# Patient Record
Sex: MALE | Race: WHITE | NOT HISPANIC OR LATINO
[De-identification: names, ages, dates, MRNs, and addresses within clinical notes are randomized per-mention and may not be internally consistent; named-entity substitution may affect disease eponyms.]

---

## 2020-01-31 PROBLEM — Z00.00 ENCOUNTER FOR PREVENTIVE HEALTH EXAMINATION: Status: ACTIVE | Noted: 2020-01-31

## 2020-02-04 ENCOUNTER — APPOINTMENT (OUTPATIENT)
Dept: ORTHOPEDIC SURGERY | Facility: CLINIC | Age: 65
End: 2020-02-04
Payer: COMMERCIAL

## 2020-02-04 VITALS — RESPIRATION RATE: 16 BRPM | WEIGHT: 185 LBS | BODY MASS INDEX: 26.48 KG/M2 | HEIGHT: 70 IN

## 2020-02-04 DIAGNOSIS — M65.331 TRIGGER FINGER, RIGHT MIDDLE FINGER: ICD-10-CM

## 2020-02-04 DIAGNOSIS — M19.042 PRIMARY OSTEOARTHRITIS, LEFT HAND: ICD-10-CM

## 2020-02-04 DIAGNOSIS — M65.341 TRIGGER FINGER, RIGHT RING FINGER: ICD-10-CM

## 2020-02-04 DIAGNOSIS — M19.041 PRIMARY OSTEOARTHRITIS, RIGHT HAND: ICD-10-CM

## 2020-02-04 PROCEDURE — 99203 OFFICE O/P NEW LOW 30 MIN: CPT | Mod: 25

## 2020-02-04 PROCEDURE — 73130 X-RAY EXAM OF HAND: CPT | Mod: RT

## 2020-02-04 PROCEDURE — 20550 NJX 1 TENDON SHEATH/LIGAMENT: CPT | Mod: RT

## 2020-03-19 ENCOUNTER — TRANSCRIPTION ENCOUNTER (OUTPATIENT)
Age: 65
End: 2020-03-19

## 2020-03-20 ENCOUNTER — OUTPATIENT (OUTPATIENT)
Dept: OUTPATIENT SERVICES | Facility: HOSPITAL | Age: 65
LOS: 1 days | Discharge: HOME | End: 2020-03-20
Payer: COMMERCIAL

## 2020-03-20 PROCEDURE — 88305 TISSUE EXAM BY PATHOLOGIST: CPT | Mod: 26

## 2020-03-24 LAB — SURGICAL PATHOLOGY STUDY: SIGNIFICANT CHANGE UP

## 2020-03-30 ENCOUNTER — APPOINTMENT (OUTPATIENT)
Dept: OTOLARYNGOLOGY | Facility: CLINIC | Age: 65
End: 2020-03-30
Payer: COMMERCIAL

## 2020-03-30 VITALS
OXYGEN SATURATION: 97 % | WEIGHT: 185 LBS | SYSTOLIC BLOOD PRESSURE: 116 MMHG | DIASTOLIC BLOOD PRESSURE: 69 MMHG | BODY MASS INDEX: 26.48 KG/M2 | HEART RATE: 67 BPM | HEIGHT: 70 IN

## 2020-03-30 DIAGNOSIS — Z85.9 PERSONAL HISTORY OF MALIGNANT NEOPLASM, UNSPECIFIED: ICD-10-CM

## 2020-03-30 DIAGNOSIS — Z87.09 PERSONAL HISTORY OF OTHER DISEASES OF THE RESPIRATORY SYSTEM: ICD-10-CM

## 2020-03-30 DIAGNOSIS — Z80.9 FAMILY HISTORY OF MALIGNANT NEOPLASM, UNSPECIFIED: ICD-10-CM

## 2020-03-30 DIAGNOSIS — Z82.3 FAMILY HISTORY OF STROKE: ICD-10-CM

## 2020-03-30 PROCEDURE — 99203 OFFICE O/P NEW LOW 30 MIN: CPT

## 2020-03-30 NOTE — REVIEW OF SYSTEMS
[Patient Intake Form Reviewed] : Patient intake form was reviewed [As noted in HPI] : as noted in HPI [Negative] : Heme/Lymph [Seasonal Allergies] : seasonal allergies [As Noted in HPI] : as noted in HPI [Hoarseness] : hoarseness

## 2020-04-02 NOTE — ASSESSMENT
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 Patient was not scoped today due to COVID 19 regulations. Management options discussed including rebiopsy, surgical resection or radiation if invasive SCC on biopsy. \par - Patient to be scoped once the situation permits and COVID 19 regulations resolves\par - Possible Direct laryngoscopy +/-  suspension microlaryngoscopy +/- LTVC  Biopsy +/- LTVC lesion surgical resection/CO2 laser \par

## 2020-04-02 NOTE — HISTORY OF PRESENT ILLNESS
[de-identified] : 64M nonsmoker and otherwise healthy developed hoarseness 3 months ago. He denies any other asociated symptoms like pain, dysphagia, aspiration/pneumonia, weight loss, neck masses or otalgia. He was evaluated by Dr. Vidales evaluated the patient and scope revealed left TVC lesion. He had CT-Neck and was reported as no cervical lymph adenopathy. Patient had biopsy on (03/20/20) of the eft vocal cord that showed  SCC in-situ. \par He is presenting today for evaluation if any further treatment is needed. \par He is a  and worked at  [employer].

## 2020-04-20 ENCOUNTER — APPOINTMENT (OUTPATIENT)
Dept: OTOLARYNGOLOGY | Facility: CLINIC | Age: 65
End: 2020-04-20
Payer: COMMERCIAL

## 2020-04-20 PROCEDURE — 99214 OFFICE O/P EST MOD 30 MIN: CPT | Mod: 25

## 2020-04-20 PROCEDURE — 31575 DIAGNOSTIC LARYNGOSCOPY: CPT

## 2020-04-22 NOTE — ASSESSMENT
[FreeTextEntry1] : 65M nonsmoker and otherwise healthy had left TVC SCC in situ that was biopsied by Dr. Vidales on 03/22/20. He had CT-Neck and was reported as no cervical lymph adenopathy. Patient is presenting today as his hoarseness is gettign worse but no other complaints. On flexible nasolaryngoscopy: hypotrophic left TVC with no obvious lesions and edematous right TVC. \par - F/u in 1 months for rescope and r/a for suspension  microlaryngosocpy

## 2020-04-22 NOTE — HISTORY OF PRESENT ILLNESS
[de-identified] : 64M nonsmoker and otherwise healthy developed hoarseness 3 months ago. He denies any other associated symptoms like pain, dysphagia, aspiration/pneumonia, weight loss, neck masses or otalgia. He was evaluated by Dr. Vidales evaluated the patient and scope revealed left TVC lesion. He had CT-Neck and was reported as no cervical lymph adenopathy. Patient had biopsy on (03/20/20) of the eft vocal cord that showed SCC in-situ. \par \par He is a  and worked at [employer]. \par   [FreeTextEntry1] : Patient is presenting today for progressive hoarseness and scope. He denies any other associated symptoms.

## 2020-04-22 NOTE — PROCEDURE
[Image(s) Captured] : image(s) captured and filed [Unable to Cooperate with Mirror] : patient unable to cooperate with mirror [None] : none [Flexible Endoscope] : examined with the flexible endoscope [Normal] : normal [True Vocal Cords Paralysis] : no true vocal cord paralysis [Glottis Edema] : ~M edematous on the right [True Vocal Cords Donnelly's Nodules] : no true vocal cord nodules [True Vocal Cords Erythematous] : true vocal cord edema on the right [Glottis Arytenoid Cartilages Erythema] : no arytenoid erythema [Glottis Arytenoid Cartilages] : no arytenoid granulomas [de-identified] : Images attached:\par Left TVC hypotrophy with no obvious lesions\par Edematous RTVC with no nodules or masses seen

## 2020-04-22 NOTE — PHYSICAL EXAM
[Midline] : trachea located in midline position [Laryngoscopy Performed] : laryngoscopy was performed, see procedure section for findings [Normal] : cranial nerves 2-12 intact [de-identified] : crowns

## 2020-05-04 ENCOUNTER — APPOINTMENT (OUTPATIENT)
Dept: INTERNAL MEDICINE | Facility: CLINIC | Age: 65
End: 2020-05-04
Payer: COMMERCIAL

## 2020-05-04 ENCOUNTER — NON-APPOINTMENT (OUTPATIENT)
Age: 65
End: 2020-05-04

## 2020-05-04 VITALS
WEIGHT: 190 LBS | TEMPERATURE: 98.2 F | HEART RATE: 68 BPM | BODY MASS INDEX: 27.2 KG/M2 | SYSTOLIC BLOOD PRESSURE: 108 MMHG | OXYGEN SATURATION: 98 % | DIASTOLIC BLOOD PRESSURE: 60 MMHG | HEIGHT: 70 IN

## 2020-05-04 DIAGNOSIS — Z01.818 ENCOUNTER FOR OTHER PREPROCEDURAL EXAMINATION: ICD-10-CM

## 2020-05-04 PROCEDURE — 99204 OFFICE O/P NEW MOD 45 MIN: CPT | Mod: 25

## 2020-05-04 PROCEDURE — 36415 COLL VENOUS BLD VENIPUNCTURE: CPT

## 2020-05-04 PROCEDURE — 93000 ELECTROCARDIOGRAM COMPLETE: CPT

## 2020-05-04 RX ORDER — METHYLPREDNISOLONE 4 MG/1
TABLET ORAL
Refills: 0 | Status: COMPLETED | COMMUNITY
End: 2020-05-04

## 2020-05-06 ENCOUNTER — LABORATORY RESULT (OUTPATIENT)
Age: 65
End: 2020-05-06

## 2020-05-06 ENCOUNTER — TRANSCRIPTION ENCOUNTER (OUTPATIENT)
Age: 65
End: 2020-05-06

## 2020-05-06 VITALS
HEART RATE: 59 BPM | HEIGHT: 70.5 IN | WEIGHT: 191.36 LBS | TEMPERATURE: 97 F | OXYGEN SATURATION: 98 % | SYSTOLIC BLOOD PRESSURE: 102 MMHG | DIASTOLIC BLOOD PRESSURE: 59 MMHG | RESPIRATION RATE: 16 BRPM

## 2020-05-06 LAB
ALBUMIN SERPL ELPH-MCNC: 4.6 G/DL
ALP BLD-CCNC: 117 U/L
ALT SERPL-CCNC: 53 U/L
ANION GAP SERPL CALC-SCNC: 10 MMOL/L
APPEARANCE: CLEAR
APTT BLD: 30.8 SEC
AST SERPL-CCNC: 36 U/L
BASOPHILS # BLD AUTO: 0.02 K/UL
BASOPHILS NFR BLD AUTO: 0.4 %
BILIRUB SERPL-MCNC: 0.5 MG/DL
BILIRUBIN URINE: NEGATIVE
BLOOD URINE: NEGATIVE
BUN SERPL-MCNC: 14 MG/DL
CALCIUM SERPL-MCNC: 9 MG/DL
CHLORIDE SERPL-SCNC: 108 MMOL/L
CO2 SERPL-SCNC: 24 MMOL/L
COLOR: NORMAL
CREAT SERPL-MCNC: 0.72 MG/DL
EOSINOPHIL # BLD AUTO: 0.29 K/UL
EOSINOPHIL NFR BLD AUTO: 5.1 %
GLUCOSE QUALITATIVE U: NEGATIVE
GLUCOSE SERPL-MCNC: 94 MG/DL
HCT VFR BLD CALC: 43.5 %
HGB BLD-MCNC: 13.9 G/DL
IMM GRANULOCYTES NFR BLD AUTO: 0.2 %
INR PPP: 0.96 RATIO
KETONES URINE: NEGATIVE
LEUKOCYTE ESTERASE URINE: NEGATIVE
LYMPHOCYTES # BLD AUTO: 2.17 K/UL
LYMPHOCYTES NFR BLD AUTO: 38.4 %
MAN DIFF?: NORMAL
MCHC RBC-ENTMCNC: 28.7 PG
MCHC RBC-ENTMCNC: 32 GM/DL
MCV RBC AUTO: 89.7 FL
MONOCYTES # BLD AUTO: 0.54 K/UL
MONOCYTES NFR BLD AUTO: 9.6 %
NEUTROPHILS # BLD AUTO: 2.62 K/UL
NEUTROPHILS NFR BLD AUTO: 46.3 %
NITRITE URINE: NEGATIVE
PH URINE: 6.5
PLATELET # BLD AUTO: 216 K/UL
POTASSIUM SERPL-SCNC: 4.6 MMOL/L
PROT SERPL-MCNC: 6.6 G/DL
PROTEIN URINE: NEGATIVE
PT BLD: 10.9 SEC
RBC # BLD: 4.85 M/UL
RBC # FLD: 13.2 %
SODIUM SERPL-SCNC: 143 MMOL/L
SPECIFIC GRAVITY URINE: 1.01
UROBILINOGEN URINE: NORMAL
WBC # FLD AUTO: 5.65 K/UL

## 2020-05-06 NOTE — ASU PATIENT PROFILE, ADULT - PSH
Surgery, elective  lesion removed from vocal cord 3/20  Surgery, elective  left pinky injury  Surgery, elective  right forearm  Surgery, elective  b/l TKR 2018 Surgery, elective  b/l knee arthroscopies  Surgery, elective  lesion removed from vocal cord 3/20  Surgery, elective  left pinky injury  Surgery, elective  right forearm  Surgery, elective  b/l TKR 2018

## 2020-05-06 NOTE — HISTORY OF PRESENT ILLNESS
[No Pertinent Cardiac History] : no history of aortic stenosis, atrial fibrillation, coronary artery disease, recent myocardial infarction, or implantable device/pacemaker [No Pertinent Pulmonary History] : no history of asthma, COPD, sleep apnea, or smoking [Excellent (>10 METs)] : Excellent (>10 METs) [Chronic Anticoagulation] : no chronic anticoagulation [Chronic Kidney Disease] : no chronic kidney disease [Diabetes] : no diabetes [FreeTextEntry1] : Direct laryngoscopy w/ biopsy [FreeTextEntry2] : 5/7/20 [FreeTextEntry3] : Dr. Clement [FreeTextEntry4] : h/o VC SCC continues to have change in voice, needs f/u laryngoscopy to evaluate concerning area with bx.

## 2020-05-07 ENCOUNTER — OUTPATIENT (OUTPATIENT)
Dept: INPATIENT UNIT | Facility: HOSPITAL | Age: 65
LOS: 1 days | Discharge: HOME | End: 2020-05-07
Payer: COMMERCIAL

## 2020-05-07 ENCOUNTER — RESULT REVIEW (OUTPATIENT)
Age: 65
End: 2020-05-07

## 2020-05-07 ENCOUNTER — APPOINTMENT (OUTPATIENT)
Dept: OTOLARYNGOLOGY | Facility: HOSPITAL | Age: 65
End: 2020-05-07

## 2020-05-07 VITALS
TEMPERATURE: 98 F | SYSTOLIC BLOOD PRESSURE: 107 MMHG | HEART RATE: 65 BPM | RESPIRATION RATE: 25 BRPM | DIASTOLIC BLOOD PRESSURE: 64 MMHG | OXYGEN SATURATION: 96 %

## 2020-05-07 DIAGNOSIS — Z41.9 ENCOUNTER FOR PROCEDURE FOR PURPOSES OTHER THAN REMEDYING HEALTH STATE, UNSPECIFIED: Chronic | ICD-10-CM

## 2020-05-07 PROCEDURE — 31541 LARYNSCOP W/TUMR EXC + SCOPE: CPT

## 2020-05-07 PROCEDURE — 88305 TISSUE EXAM BY PATHOLOGIST: CPT | Mod: 26

## 2020-05-07 PROCEDURE — 88305 TISSUE EXAM BY PATHOLOGIST: CPT

## 2020-05-07 PROCEDURE — 31536 LARYNGOSCOPY W/BX & OP SCOPE: CPT | Mod: GC

## 2020-05-07 RX ORDER — PANTOPRAZOLE SODIUM 20 MG/1
1 TABLET, DELAYED RELEASE ORAL
Qty: 14 | Refills: 0
Start: 2020-05-07 | End: 2020-05-20

## 2020-05-07 RX ORDER — OXYCODONE AND ACETAMINOPHEN 5; 325 MG/1; MG/1
1 TABLET ORAL ONCE
Refills: 0 | Status: DISCONTINUED | OUTPATIENT
Start: 2020-05-07 | End: 2020-05-07

## 2020-05-07 RX ORDER — SODIUM CHLORIDE 9 MG/ML
1000 INJECTION, SOLUTION INTRAVENOUS
Refills: 0 | Status: DISCONTINUED | OUTPATIENT
Start: 2020-05-07 | End: 2020-05-07

## 2020-05-07 RX ORDER — ACETAMINOPHEN 500 MG
650 TABLET ORAL ONCE
Refills: 0 | Status: DISCONTINUED | OUTPATIENT
Start: 2020-05-07 | End: 2020-05-07

## 2020-05-08 LAB — SURGICAL PATHOLOGY STUDY: SIGNIFICANT CHANGE UP

## 2020-05-11 ENCOUNTER — APPOINTMENT (OUTPATIENT)
Dept: OTOLARYNGOLOGY | Facility: CLINIC | Age: 65
End: 2020-05-11
Payer: COMMERCIAL

## 2020-05-11 PROCEDURE — 99212 OFFICE O/P EST SF 10 MIN: CPT | Mod: 95

## 2020-05-18 NOTE — ASSESSMENT
[FreeTextEntry1] : 65M with left TVC SCC in situ with no evidence of disease on the recent biopsy done 05/07/20 ( Pathology: Left vocal cord: edema, mild, chronic inflammation & recent hemorrhage). Pathology report was discussed with patient and will continue clinical surveillance with follow up in 4-6 weeks.\par No issues after the biopsy\par

## 2020-05-18 NOTE — HISTORY OF PRESENT ILLNESS
[Other Location: e.g. School (Enter Location, City,State)___] : at [unfilled], at the time of the visit. [Medical Office: ([facility])___] : at the medical office located in  [Patient] : the patient [FreeTextEntry1] : Patient is doing well\par Voice is stable after the recent biopsy\par No swallowing problems \par Surgical Final Report\par \par \par \par \par Final Diagnosis\par \par 1. Left mid vocal cord undersurface lesion:\par - Vocal cord, with edema, mild, chronic inflammation, and\par recent hemorrhage.\par \par Verified by: Cindy Mccoy MD\par (Electronic Signature)\par Reported on: 05/08/20 09:27\par  [de-identified] : 65M nonsmoker and otherwise healthy developed hoarseness 3 months ago. He denies any other associated symptoms like pain, dysphagia, aspiration/pneumonia, weight loss, neck masses or otalgia. He was evaluated by Dr. Vidales evaluated the patient and scope revealed left TVC lesion. He had CT-Neck and was reported as no cervical lymph adenopathy. Patient had biopsy on (03/20/20) of the eft vocal cord that showed SCC in-situ. \par \par He is a  and worked at [employer].

## 2020-05-18 NOTE — REASON FOR VISIT
[Subsequent Evaluation] : a subsequent evaluation for [FreeTextEntry2] : discuss pathology result of the recent biopsy

## 2020-07-08 ENCOUNTER — APPOINTMENT (OUTPATIENT)
Dept: OTOLARYNGOLOGY | Facility: CLINIC | Age: 65
End: 2020-07-08
Payer: COMMERCIAL

## 2020-07-08 VITALS
HEIGHT: 70 IN | OXYGEN SATURATION: 98 % | WEIGHT: 190 LBS | DIASTOLIC BLOOD PRESSURE: 70 MMHG | BODY MASS INDEX: 27.2 KG/M2 | SYSTOLIC BLOOD PRESSURE: 119 MMHG | HEART RATE: 78 BPM

## 2020-07-08 PROCEDURE — 31575 DIAGNOSTIC LARYNGOSCOPY: CPT

## 2020-07-08 PROCEDURE — 99213 OFFICE O/P EST LOW 20 MIN: CPT | Mod: 25

## 2020-07-08 NOTE — HISTORY OF PRESENT ILLNESS
[de-identified] : 65M nonsmoker and otherwise healthy developed hoarseness on or around January 2020. He denies any other associated symptoms like pain, dysphagia, aspiration/pneumonia, weight loss, neck masses or otalgia. He was evaluated by Dr. Vidales evaluated the patient and scope revealed left TVC lesion. He had CT-Neck and was reported as no cervical lymph adenopathy. Patient had biopsy on (03/20/20) of the left vocal cord that showed SCC in-situ. \par \par Due to progressive hoarseness, we re-biopsied the left vocal cord on 5/7/20, final path vocal cord with edema, mild, chronic inflammation and recent hemorrhage.\par \par He is a  and worked at [employer]. \par \par Interval History: Patient is doing well. Reports voice comes and goes, can be gravelly at times.  Denies dysphagia, odynophagia, fever, chills, weight loss, dyspnea, or hemoptysis.  He is on OTC antihistamines prn for seasonal allergies.

## 2020-07-08 NOTE — ASSESSMENT
[FreeTextEntry1] : 65M with left TVC SCC in situ with no evidence of disease on the recent biopsy done 05/07/20 (Pathology: Left vocal cord: edema, mild, chronic inflammation & recent hemorrhage) or scope exam today. \par \par - Discussed no residual cancer was detected in recent biopsy.\par - F/u 6 weeks.\par - RTC sooner should any worrisome symptoms develop.\par - Pt verbalized understanding of above.

## 2020-07-08 NOTE — PROCEDURE
[Image(s) Captured] : image(s) captured and filed [Unable to Cooperate with Mirror] : patient unable to cooperate with mirror [Hoarseness] : hoarseness not clearly evaluated by indirect laryngoscopy [None] : none [Flexible Endoscope] : examined with the flexible endoscope [de-identified] : Flexible fiberoptic laryngoscope advanced orally, no oropharyngeal lesions or masses identified, larynx visualized, adequate and symmetric cord adduction and abduction noted, no vocal cord masses or lesions noted.\par  [de-identified] : surveillance of L TVC SCC in situ

## 2020-08-19 ENCOUNTER — APPOINTMENT (OUTPATIENT)
Dept: OTOLARYNGOLOGY | Facility: CLINIC | Age: 65
End: 2020-08-19

## 2020-08-31 ENCOUNTER — APPOINTMENT (OUTPATIENT)
Dept: OTOLARYNGOLOGY | Facility: CLINIC | Age: 65
End: 2020-08-31
Payer: COMMERCIAL

## 2020-08-31 VITALS
TEMPERATURE: 98.6 F | OXYGEN SATURATION: 97 % | HEART RATE: 79 BPM | SYSTOLIC BLOOD PRESSURE: 113 MMHG | DIASTOLIC BLOOD PRESSURE: 70 MMHG

## 2020-08-31 PROCEDURE — 99213 OFFICE O/P EST LOW 20 MIN: CPT | Mod: 25

## 2020-08-31 PROCEDURE — 31575 DIAGNOSTIC LARYNGOSCOPY: CPT

## 2020-08-31 NOTE — ASSESSMENT
[FreeTextEntry1] : 65M with left TVC SCC in situ with no evidence of disease on the recent biopsy done 05/07/20 (Pathology: Left vocal cord: edema, mild, chronic inflammation & recent hemorrhage) with improved hoarseness, JAZ on exam today, incidental finding of left nasal polyp, asymptomatic. \par \par - No evidence of recurrence on scope exam today.\par - F/u 2 months.\par - RTC sooner should any worrisome symptoms develop.\par - Pt verbalized understanding of above. \par \par

## 2020-08-31 NOTE — PROCEDURE
[Image(s) Captured] : image(s) captured and filed [Unable to Cooperate with Mirror] : patient unable to cooperate with mirror [Hoarseness] : hoarseness not clearly evaluated by indirect laryngoscopy [Lesion] : lesion identified by mirror examination needing further evaluation [None] : none [Flexible Endoscope] : examined with the flexible endoscope [de-identified] : Flexible fiberoptic laryngoscope advanced orally and nasally, nasal mucosa normal, left nasal cavity polyps, no oropharyngeal lesions or masses identified, larynx visualized, adequate and symmetric cord adduction and abduction noted, no vocal cord masses or lesions noted.

## 2020-08-31 NOTE — HISTORY OF PRESENT ILLNESS
[de-identified] : 65M nonsmoker and otherwise healthy developed hoarseness on or around January 2020. He denies any other associated symptoms like pain, dysphagia, aspiration/pneumonia, weight loss, neck masses or otalgia. Dr. Vidales evaluated the patient and scope revealed left TVC lesion. He had CT-Neck and was reported as no cervical lymph adenopathy. Patient had biopsy on (03/20/20) of the left vocal cord that showed SCC in-situ. \par \par Due to progressive hoarseness, we re-biopsied the left vocal cord on 5/7/20, final path vocal cord with edema, mild, chronic inflammation and recent hemorrhage.\par \par He is a  and worked at [employer]. \par \par Patient is doing well. Reports voice is better and stronger. Denies dysphagia, odynophagia, fever, chills, weight loss, dyspnea, or hemoptysis.  \par

## 2020-12-21 ENCOUNTER — APPOINTMENT (OUTPATIENT)
Dept: OTOLARYNGOLOGY | Facility: CLINIC | Age: 65
End: 2020-12-21
Payer: COMMERCIAL

## 2020-12-21 VITALS
SYSTOLIC BLOOD PRESSURE: 149 MMHG | WEIGHT: 185 LBS | HEIGHT: 70 IN | TEMPERATURE: 98 F | DIASTOLIC BLOOD PRESSURE: 75 MMHG | HEART RATE: 77 BPM | OXYGEN SATURATION: 96 % | BODY MASS INDEX: 26.48 KG/M2

## 2020-12-21 PROCEDURE — 99072 ADDL SUPL MATRL&STAF TM PHE: CPT

## 2020-12-21 PROCEDURE — 31575 DIAGNOSTIC LARYNGOSCOPY: CPT

## 2020-12-21 PROCEDURE — 99213 OFFICE O/P EST LOW 20 MIN: CPT | Mod: 25

## 2020-12-22 NOTE — DATA REVIEWED
[No studies available for review at this time] : No studies available for review at this time Offered and patient declined

## 2021-01-06 NOTE — ADDENDUM
[FreeTextEntry1] : Speech Pathology:\par \par Pt seen today in conjunction with Dr. Clement in clinic.\par \par Pt with h/o dysphonia since Jan 2020, found to have L TVF SCCa in situ on 3/20/20.  Re-biopsy on 5/7/20 2/2 progressive dysphonia only showed chronic inflammation & recent hemorrhage.  Today, pt reported on/off dysphonia.  He reported that his voice is now at "80-85%" of his baseline.  He demonstrated mild to moderate dysphonia with breathy and rough vocal quality during today's visit.  Laryngoscopic exam today with JAZ, just some TVF erythema.  Pt advised to monitor voice.  He will RTC in 2 months for follow-up/ surveillance.\par \par Joseph Carrington MS, CCC-SLP, BCS-S\par Supervisor, Speech Pathology

## 2021-01-06 NOTE — PROCEDURE
[Image(s) Captured] : image(s) captured and filed [Unable to Cooperate with Mirror] : patient unable to cooperate with mirror [Hoarseness] : hoarseness not clearly evaluated by indirect laryngoscopy [None] : none [Flexible Endoscope] : examined with the flexible endoscope [de-identified] : Flexible fiberoptic laryngoscope advanced nasally, no nasopharyngeal or oropharyngeal lesions or masses identified, larynx visualized, adequate and symmetric cord adduction and abduction noted, no vocal cord masses or lesions noted. \par

## 2021-01-06 NOTE — HISTORY OF PRESENT ILLNESS
[de-identified] : 65M nonsmoker and otherwise healthy developed hoarseness on or around January 2020. He denied any other associated symptoms like pain, dysphagia, aspiration/pneumonia, weight loss, neck masses or otalgia. Dr. Vidales evaluated the patient and scope revealed left TVC lesion. He had CT-Neck and was reported as no cervical lymph adenopathy. Patient had biopsy on (03/20/20) of the left vocal cord that showed SCC in-situ. \par \par Due to progressive hoarseness, we re-biopsied the left vocal cord on 5/7/20, final path vocal cord with edema, mild, chronic inflammation and recent hemorrhage.\par \par He is a  and worked at [employer]. \par \par He reports on and off hoarseness.. Denies dysphagia, odynophagia, fever, chills, weight loss, dyspnea, or hemoptysis. \par \par

## 2021-01-06 NOTE — ASSESSMENT
[FreeTextEntry1] : 65M with left TVC SCC in situ with no evidence of disease on the recent biopsy done 05/07/20 (Pathology: Left vocal cord: edema, mild, chronic inflammation & recent hemorrhage), JAZ/NER on exam today. \par \par - F/u 2 months.\par - RTC sooner should any worrisome symptoms develop.\par - Pt verbalized understanding of above. \par

## 2021-05-12 ENCOUNTER — APPOINTMENT (OUTPATIENT)
Dept: OTOLARYNGOLOGY | Facility: CLINIC | Age: 66
End: 2021-05-12
Payer: MEDICARE

## 2021-05-12 VITALS
WEIGHT: 190 LBS | BODY MASS INDEX: 26.9 KG/M2 | HEIGHT: 70.5 IN | DIASTOLIC BLOOD PRESSURE: 72 MMHG | SYSTOLIC BLOOD PRESSURE: 128 MMHG | HEART RATE: 76 BPM | TEMPERATURE: 96.9 F | OXYGEN SATURATION: 97 %

## 2021-05-12 PROCEDURE — 31575 DIAGNOSTIC LARYNGOSCOPY: CPT

## 2021-05-13 NOTE — ASSESSMENT
[FreeTextEntry1] : 66M with left TVC SCC in situ dx 3/2020, had subsequent biopsy done 05/07/20 d/t progressive hoarseness, path benign (Pathology: Left vocal cord: edema, mild, chronic inflammation & recent hemorrhage), Doing well, JAZ/NER on exam today. \par \par - F/u 3 months.\par - RTC sooner should any worrisome symptoms develop.\par - Pt verbalized understanding of above. \par  \par

## 2021-05-13 NOTE — PROCEDURE
[Image(s) Captured] : image(s) captured and filed [Unable to Cooperate with Mirror] : patient unable to cooperate with mirror [Hoarseness] : hoarseness not clearly evaluated by indirect laryngoscopy [None] : none [Flexible Endoscope] : examined with the flexible endoscope [de-identified] : Flexible fiberoptic laryngoscope advanced nasally, no nasopharyngeal or oropharyngeal lesions or masses identified, larynx visualized, adequate and symmetric cord adduction and abduction noted, no vocal cord masses or lesions noted. \par  \par  [de-identified] : surveillance of glottic cancer

## 2021-05-13 NOTE — HISTORY OF PRESENT ILLNESS
[de-identified] : 66M nonsmoker and otherwise healthy developed hoarseness on or around January 2020. He denied any other associated symptoms like pain, dysphagia, aspiration/pneumonia, weight loss, neck masses or otalgia. Dr. Vidales evaluated the patient and scope revealed left TVC lesion. He had CT-Neck and was reported as no cervical lymph adenopathy. Patient had biopsy on (03/20/20) of the left vocal cord that showed SCC in-situ. \par \par Due to progressive hoarseness, we re-biopsied the left vocal cord on 5/7/20, final path vocal cord with edema, mild, chronic inflammation and recent hemorrhage.\par \par He is a  and worked at [employer]. \par \par  [FreeTextEntry1] : He reports voice is better, but has occasional hoarseness.. Denies dysphagia, odynophagia, fever, chills, weight loss, dyspnea, or hemoptysis. He had COVID Feb 2021 with mild symptoms of chills, kidney pain, fatigue. He is seeing his PCP today, and will likely get the vaccine.  He has routine check-ups/screening with the [facility], was referred to sinus md for sinus/PND.\par

## 2021-09-24 ENCOUNTER — APPOINTMENT (OUTPATIENT)
Dept: OTOLARYNGOLOGY | Facility: CLINIC | Age: 66
End: 2021-09-24
Payer: MEDICARE

## 2021-09-24 PROCEDURE — 99215 OFFICE O/P EST HI 40 MIN: CPT | Mod: 25

## 2021-09-24 PROCEDURE — 31579 LARYNGOSCOPY TELESCOPIC: CPT

## 2021-09-24 NOTE — PHYSICAL EXAM
[Midline] : trachea located in midline position [Normal] : no rashes [FreeTextEntry1] : normal clarity, with adequate pitch control, range and projection

## 2021-09-24 NOTE — ASSESSMENT
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  Pt is stable and well without changes.  He should followup in 3 mo for repeat evaluation.  \par \par In terms of nasal symptoms, on exam there is diffuse nasal polyposis.  We discussed conservative treatment strategies including nasal saline and nasal steroids.  Pt will have CT sinus for further evaluation and follow up after to review results.\par \par - fu 3 mo for head and neck exam\par - CT sinus\par - nasal saline, nasal steroids.

## 2021-09-24 NOTE — REASON FOR VISIT
[Initial Consultation] : an initial consultation for [FreeTextEntry2] : hx of carcinoma in situ, L TVF

## 2021-09-24 NOTE — PROCEDURE
[de-identified] : Flexible Laryngoscopy with Stroboscopy \par Procedure Note\par \par Pre-operative Diagnosis: hx of Left vocal fold lesion\par Post-operative Diagnosis: normal laryngeal exam, nasal polyposis\par Anesthesia: Topical - 1 % Lidocaine/Phenylephrine\par Procedure:  Flexible Laryngoscopy with Stroboscopy\par  \par Procedure Details:  \par The patient was placed in the sitting position.  After decongestant and anesthesia were applied the laryngoscope was passed.  The nasal cavities, nasopharynx, oropharynx, hypopharynx, and larynx were all examined.  Vocal folds were examined during respiration and phonation.  The following findings were noted:\par \par Findings:  \par Nose: Septum is midline, turbinates are normal, nasal airways patent, +bilateral nasal polyposis, mucosa normal\par Nasopharynx: Adenoids normal, no masses, eustachian tube normal\par Oropharynx: Pharyngeal walls symmetric and without lesion. Tonsils/fossae symmetric\par Hypopharynx: Hypopharynx and pyriform sinuses without lesion. No masses or asymmetry.  No pooling of secretions.\par Larynx:  Epiglottis and aryepiglottic folds were sharp and crisp bilaterally.  Bilateral false vocal folds normal appearance. Airway was widely patent.\par \par Strobe Exam Ratings\par 		\par TVF Appearance: normal\par TVF Mobility: normal\par Edema/hypertrophy: normal\par Mucus on TVF: normal\par Glottic Closure: adequate\par Mucosal Wave: normal\par Amplitude of Vibration: normal\par Phase: symmetric\par Supraglottic Hyperfunction: none\par Other Findings:\par \par Condition: Stable.  Patient tolerated procedure well.\par \par Complications: None\par

## 2021-09-24 NOTE — HISTORY OF PRESENT ILLNESS
[de-identified] : 65 yo male, former patient of Dr. 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  Pt underwent excisional biopsy in spring of 2020, followed by a negative re biopsy 5/2020.  Since that time the patient has been stable and well.  No changes in voice.  No throat pain.  No issues with chewing, eating swallowing or breathing.  No fevers, night sweats or weight loss.  No otalgia.  Non smoker, non drinker.  Previously employed as a  with work at [employer]\par \par Of note the patient he does have a longer standing hx of nasal congestion, anosmia and chronic sinusitis.  He has a known dx of nasal polyposis, but never had this addressed.  No ENT issues otherwise.

## 2021-09-27 NOTE — REASON FOR VISIT
[Initial Evaluation] : an initial evaluation for [FreeTextEntry2] : Left TVC SCC in situ [No Carotid Bruits] : no carotid bruits [No Abdominal Bruit] : a ~M bruit was not heard ~T in the abdomen [Pedal Pulses Present] : the pedal pulses are present [Normal Posterior Cervical Nodes] : no posterior cervical lymphadenopathy [Normal Anterior Cervical Nodes] : no anterior cervical lymphadenopathy [Normal] : normal gait, coordination grossly intact, no focal deficits and deep tendon reflexes were 2+ and symmetric [Speech Grossly Normal] : speech grossly normal [Memory Grossly Normal] : memory grossly normal [Normal Affect] : the affect was normal [Alert and Oriented x3] : oriented to person, place, and time [Normal Mood] : the mood was normal [Normal Insight/Judgement] : insight and judgment were intact [Right Foot Was Examined] : Right foot ~C was examined [Left Foot Was Examined] : left foot ~C was examined [None] : no ulcers in either foot were found [] : both feet [de-identified] : Min to no le edema, dec rt dp pulse [de-identified] : rt biceps tendontenderness at the antecubital fossa [TWNoteComboBox5] : +1 [TWNoteComboBox6] : +2

## 2021-12-16 ENCOUNTER — OUTPATIENT (OUTPATIENT)
Dept: OUTPATIENT SERVICES | Facility: HOSPITAL | Age: 66
LOS: 1 days | End: 2021-12-16
Payer: MEDICARE

## 2021-12-16 ENCOUNTER — APPOINTMENT (OUTPATIENT)
Dept: CT IMAGING | Facility: HOSPITAL | Age: 66
End: 2021-12-16

## 2021-12-16 DIAGNOSIS — Z41.9 ENCOUNTER FOR PROCEDURE FOR PURPOSES OTHER THAN REMEDYING HEALTH STATE, UNSPECIFIED: Chronic | ICD-10-CM

## 2021-12-16 PROCEDURE — G1004: CPT

## 2021-12-16 PROCEDURE — 70486 CT MAXILLOFACIAL W/O DYE: CPT | Mod: 26,ME

## 2021-12-16 PROCEDURE — 70486 CT MAXILLOFACIAL W/O DYE: CPT | Mod: ME

## 2021-12-20 ENCOUNTER — APPOINTMENT (OUTPATIENT)
Dept: OTOLARYNGOLOGY | Facility: CLINIC | Age: 66
End: 2021-12-20
Payer: MEDICARE

## 2021-12-20 VITALS
BODY MASS INDEX: 27.2 KG/M2 | HEIGHT: 70 IN | SYSTOLIC BLOOD PRESSURE: 123 MMHG | DIASTOLIC BLOOD PRESSURE: 76 MMHG | HEART RATE: 76 BPM | TEMPERATURE: 97.8 F | WEIGHT: 190 LBS

## 2021-12-20 PROCEDURE — 31579 LARYNGOSCOPY TELESCOPIC: CPT

## 2021-12-20 PROCEDURE — 99215 OFFICE O/P EST HI 40 MIN: CPT | Mod: 25

## 2021-12-20 NOTE — HISTORY OF PRESENT ILLNESS
[de-identified] : 65 yo male, former patient of Dr. 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  Pt underwent excisional biopsy in spring of 2020, followed by a negative re biopsy 5/2020.  Since that time the patient has been stable and well.  No changes in voice.  No throat pain.  No issues with chewing, eating swallowing or breathing.  No fevers, night sweats or weight loss.  No otalgia.  Non smoker, non drinker.  Previously employed as a  with work at [employer]\par \par Of note the patient he does have a longer standing hx of nasal congestion, anosmia and chronic sinusitis.  He has a known dx of nasal polyposis, but never had this addressed.  No ENT issues otherwise.\par - [FreeTextEntry1] : Update 12/20/21\par Overall stable and well.  No fever, night sweats, weight loss or referred otalgia.  No change in voice, swallowing or breathing.  Pt still with nasal symptoms.  He did use nasal saline and nasal steroids to minimal avail.  He completed a CT sinus and presents to review.

## 2021-12-20 NOTE — ASSESSMENT
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  Pt is stable and well without changes.  He should followup in 3 mo for repeat evaluation.  \par \par In terms of nasal symptoms, on exam there is diffuse nasal polyposis.  We discussed conservative treatment strategies including nasal saline and nasal steroids.  CT sinus completed and consistent with sinusitis and nasal polyposis.  We discussed treatment options including endoscopic sinus surgery for which I believe he is a candidate.  Risk, benefits and alternatives of sinus surgery were discussed including bleeding, infection, pain, risk of anesthesia, injury to the orbit or skull-base, worsened sense of smell, need for further procedures and possible time off of work.\par \par - nasal saline, nasal steroids.\par - Plan for endoscopic sinus surgery, removal of nasal polyps, possible septoplasty and turbinectomy.\par - fu 3 mo for head and neck exam\par \par

## 2021-12-20 NOTE — PROCEDURE
[de-identified] : Flexible Laryngoscopy with Stroboscopy\par  \par Procedure Details: \par The patient was placed in the sitting position. After decongestant and anesthesia were applied the laryngoscope was passed. The nasal cavities, nasopharynx, oropharynx, hypopharynx, and larynx were all examined. Vocal folds were examined during respiration and phonation. The following findings were noted:\par \par Findings: \par Nose: Septum is midline, turbinates are normal, nasal airways patent, +bilateral nasal polyposis, mucosa normal\par Nasopharynx: Adenoids normal, no masses, eustachian tube normal\par Oropharynx: Pharyngeal walls symmetric and without lesion. Tonsils/fossae symmetric\par Hypopharynx: Hypopharynx and pyriform sinuses without lesion. No masses or asymmetry. No pooling of secretions.\par Larynx: Epiglottis and aryepiglottic folds were sharp and crisp bilaterally. Bilateral false vocal folds normal appearance. Airway was widely patent.\par \par Strobe Exam Ratings\par 		\par TVF Appearance: normal, though small area of mucus on mid to ant 1/3 of left vocal fold\par TVF Mobility: normal\par Edema/hypertrophy: normal\par Mucus on TVF: normal\par Glottic Closure: adequate\par Mucosal Wave: normal\par Amplitude of Vibration: normal\par Phase: symmetric\par Supraglottic Hyperfunction: none\par Other Findings:\par \par Condition: Stable. Patient tolerated procedure well.\par \par Complications: None\par

## 2022-07-05 ENCOUNTER — APPOINTMENT (OUTPATIENT)
Dept: OTOLARYNGOLOGY | Facility: CLINIC | Age: 67
End: 2022-07-05

## 2022-07-20 ENCOUNTER — APPOINTMENT (OUTPATIENT)
Dept: OTOLARYNGOLOGY | Facility: AMBULATORY SURGERY CENTER | Age: 67
End: 2022-07-20

## 2022-07-22 ENCOUNTER — APPOINTMENT (OUTPATIENT)
Dept: OTOLARYNGOLOGY | Facility: CLINIC | Age: 67
End: 2022-07-22

## 2022-07-29 ENCOUNTER — APPOINTMENT (OUTPATIENT)
Dept: OTOLARYNGOLOGY | Facility: CLINIC | Age: 67
End: 2022-07-29

## 2022-08-02 ENCOUNTER — APPOINTMENT (OUTPATIENT)
Dept: OTOLARYNGOLOGY | Facility: CLINIC | Age: 67
End: 2022-08-02

## 2022-08-02 VITALS
OXYGEN SATURATION: 98 % | BODY MASS INDEX: 27.2 KG/M2 | HEIGHT: 70 IN | SYSTOLIC BLOOD PRESSURE: 103 MMHG | TEMPERATURE: 98 F | WEIGHT: 190 LBS | DIASTOLIC BLOOD PRESSURE: 65 MMHG | HEART RATE: 71 BPM

## 2022-08-02 DIAGNOSIS — R43.0 ANOSMIA: ICD-10-CM

## 2022-08-02 PROCEDURE — 31579 LARYNGOSCOPY TELESCOPIC: CPT

## 2022-08-02 PROCEDURE — 99215 OFFICE O/P EST HI 40 MIN: CPT | Mod: 25

## 2022-08-02 NOTE — PROCEDURE
[de-identified] : -\par Flexible Laryngoscopy with Stroboscopy\par  \par Procedure Details: \par The patient was placed in the sitting position. After decongestant and anesthesia were applied the laryngoscope was passed. The nasal cavities, nasopharynx, oropharynx, hypopharynx, and larynx were all examined. Vocal folds were examined during respiration and phonation. The following findings were noted:\par \par Findings: \par Nose: Septum is midline, turbinates are normal, nasal airways patent, +bilateral nasal polyposis, mucosa normal\par Nasopharynx: Adenoids normal, no masses, eustachian tube normal\par Oropharynx: Pharyngeal walls symmetric and without lesion. Tonsils/fossae symmetric\par Hypopharynx: Hypopharynx and pyriform sinuses without lesion. No masses or asymmetry. No pooling of secretions.\par Larynx: Epiglottis and aryepiglottic folds were sharp and crisp bilaterally. Bilateral false vocal folds normal appearance. Airway was widely patent.\par \par Strobe Exam Ratings\par 		\par TVF Appearance: normal bilaterally with minimal post surgical change\par TVF Mobility: normal\par Edema/hypertrophy: normal\par Mucus on TVF: normal\par Glottic Closure: adequate\par Mucosal Wave: normal\par Amplitude of Vibration: normal\par Phase: symmetric\par Supraglottic Hyperfunction: none\par Other Findings:\par \par Condition: Stable. Patient tolerated procedure well.\par \par Complications: None\par

## 2022-08-02 NOTE — HISTORY OF PRESENT ILLNESS
[de-identified] : 67 yo male, former patient of Dr. 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  Pt underwent excisional biopsy in spring of 2020, followed by a negative re biopsy 5/2020.  Since that time the patient has been stable and well.  No changes in voice.  No throat pain.  No issues with chewing, eating swallowing or breathing.  No fevers, night sweats or weight loss.  No otalgia.  Non smoker, non drinker.  Previously employed as a  with work at [employer]\par \par Of note the patient he does have a longer standing hx of nasal congestion, anosmia and chronic sinusitis.  He has a known dx of nasal polyposis, but never had this addressed.  No ENT issues otherwise.\par -\par Update 12/20/21\par Overall stable and well.  No fever, night sweats, weight loss or referred otalgia.  No change in voice, swallowing or breathing.  Pt still with nasal symptoms.  He did use nasal saline and nasal steroids to minimal avail.  He completed a CT sinus and presents to review.\par - [FreeTextEntry1] :  8/2/22\par Overall stable and well.  No fever, night sweats, weight loss, neck mass or referred otalgia.  No change in voice, swallowing or breathing.  Pt still with nasal symptoms.  He did use nasal saline and nasal steroids to minimal avail.  Had PFTs completed today and had noted improvement from previous.  Overall feeling very well.

## 2022-08-02 NOTE — REASON FOR VISIT
[Subsequent Evaluation] : a subsequent evaluation for [FreeTextEntry2] : Cancer surveillance,  discuss surgery

## 2022-08-02 NOTE — PHYSICAL EXAM
[FreeTextEntry1] : normal clarity, with adequate pitch control, range and projection [Midline] : trachea located in midline position [Normal] : no rashes

## 2022-08-05 ENCOUNTER — APPOINTMENT (OUTPATIENT)
Dept: OTOLARYNGOLOGY | Facility: CLINIC | Age: 67
End: 2022-08-05

## 2023-09-01 ENCOUNTER — APPOINTMENT (OUTPATIENT)
Dept: OTOLARYNGOLOGY | Facility: CLINIC | Age: 68
End: 2023-09-01
Payer: MEDICARE

## 2023-09-01 VITALS
HEART RATE: 62 BPM | DIASTOLIC BLOOD PRESSURE: 76 MMHG | HEIGHT: 70 IN | SYSTOLIC BLOOD PRESSURE: 106 MMHG | BODY MASS INDEX: 27.2 KG/M2 | TEMPERATURE: 98.2 F | WEIGHT: 190 LBS

## 2023-09-01 DIAGNOSIS — D02.0 CARCINOMA IN SITU OF LARYNX: ICD-10-CM

## 2023-09-01 PROCEDURE — 99215 OFFICE O/P EST HI 40 MIN: CPT | Mod: 25

## 2023-09-01 PROCEDURE — 30901 CONTROL OF NOSEBLEED: CPT | Mod: 59

## 2023-09-01 PROCEDURE — 31579 LARYNGOSCOPY TELESCOPIC: CPT

## 2023-09-01 RX ORDER — BUDESONIDE 0.5 MG/2ML
0.5 INHALANT ORAL
Qty: 2 | Refills: 3 | Status: ACTIVE | COMMUNITY
Start: 2023-09-01 | End: 1900-01-01

## 2023-09-01 NOTE — PROCEDURE
[FreeTextEntry6] : - Procedure Note    Pre-operative Diagnosis: nasal polyposis Post-operative Diagnosis: same Anesthesia: Topical Procedure: Bilateral nasal endoscopy    Procedure Details:  After topical anesthesia and decongestant, the patient was placed in the supine position. The telescope was passed along the left nasal floor to the nasopharynx. It was then passed into the region of the middle meatus, middle turbinate, and the sphenoethmoid region.  An identical procedure was performed on the right side.     Findings:  Mucosa: 	                inflammed Nasal septum: 	midline 	 Discharge: 	none	 Turbinates: 	normal	 Adenoid: 	                normal	 Posterior choanae: 	normal	 Eustachian tubes: 	normal	 Mucous stranding: 	normal 	 Lesions: 	                +nasal polyposis bilaterally    Comments:  Condition: Stable. Patient tolerated procedure well. Complications: None  [de-identified] : - Flexible Laryngoscopy with Stroboscopy   Procedure Details:  The patient was placed in the sitting position. After decongestant and anesthesia were applied the laryngoscope was passed. The nasal cavities, nasopharynx, oropharynx, hypopharynx, and larynx were all examined. Vocal folds were examined during respiration and phonation. The following findings were noted:  Findings:  Nose: Septum is midline, turbinates are normal, nasal airways patent, +bilateral nasal polyposis, mucosa normal Nasopharynx: Adenoids normal, no masses, eustachian tube normal Oropharynx: Pharyngeal walls symmetric and without lesion. Tonsils/fossae symmetric Hypopharynx: Hypopharynx and pyriform sinuses without lesion. No masses or asymmetry. No pooling of secretions. Larynx: Epiglottis and aryepiglottic folds were sharp and crisp bilaterally. Bilateral false vocal folds normal appearance. Airway was widely patent.  Strobe Exam Ratings 		 TVF Appearance: normal bilaterally with minimal post surgical change TVF Mobility: normal Edema/hypertrophy: normal Mucus on TVF: normal Glottic Closure: adequate Mucosal Wave: normal Amplitude of Vibration: normal Phase: symmetric Supraglottic Hyperfunction: none Other Findings:  Condition: Stable. Patient tolerated procedure well.  Complications: None

## 2023-09-01 NOTE — HISTORY OF PRESENT ILLNESS
[de-identified] : 65 yo male, former patient of Dr. 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  Pt underwent excisional biopsy in spring of 2020, followed by a negative re biopsy 5/2020.  Since that time the patient has been stable and well.  No changes in voice.  No throat pain.  No issues with chewing, eating swallowing or breathing.  No fevers, night sweats or weight loss.  No otalgia.  Non smoker, non drinker.  Previously employed as a  with work at [employer]  Of note the patient he does have a longer standing hx of nasal congestion, anosmia and chronic sinusitis.  He has a known dx of nasal polyposis, but never had this addressed.  No ENT issues otherwise. - Update 12/20/21 Overall stable and well.  No fever, night sweats, weight loss or referred otalgia.  No change in voice, swallowing or breathing.  Pt still with nasal symptoms.  He did use nasal saline and nasal steroids to minimal avail.  He completed a CT sinus and presents to review. - [FreeTextEntry1] :  9/1/23 Overall stable and well.  No fever, night sweats, weight loss, neck mass or referred otalgia.  No change in voice, swallowing or breathing.  Pt still with nasal symptoms. He has decreased sense of smell. He did use nasal saline and nasal steroids to minimal avail.  Had PFTs completed today and had noted improvement from previous.  Overall feeling very well. Of note he has decreased hearing. He had a hearing test in [place] and hearing aids were recommended. He is planning on getting hearing aids after surgery.

## 2023-09-01 NOTE — ASSESSMENT
[FreeTextEntry1] : 67 yo male with a hx of carcinoma in-situ of the left true vocal fold.  Pt is stable and well without changes.  He should followup in 3 mo for repeat evaluation.    In terms of nasal symptoms, on exam there is diffuse nasal polyposis.  Patient has tried nasal rinses and steroids. We received his sinus CT which is consistent with sinusitis and nasal polyposis. He is preop for endoscopic sinus surgery, possible septoplasty and turbinectomy.  Risk, benefits and alternatives of sinus surgery were discussed including bleeding, infection, pain, risk of anesthesia, injury to the orbit or skull-base, worsened sense of smell, need for further procedures and possible time off of work. I am recommending budesonide rinses. We also discussed Dupixent after surgery if he is still symptomatic.   - nasal saline, nasal steroids, budesonide rinses after surgery  - Plan for endoscopic sinus surgery, removal of nasal polyps, possible septoplasty and turbinectomy next week - fu 3 mo for head and neck exam - he lives in [place] and said he has an ENT there

## 2023-09-05 ENCOUNTER — TRANSCRIPTION ENCOUNTER (OUTPATIENT)
Age: 68
End: 2023-09-05

## 2023-09-05 NOTE — ASU PATIENT PROFILE, ADULT - NSICDXPASTSURGICALHX_GEN_ALL_CORE_FT
PAST SURGICAL HISTORY:  Surgery, elective b/l TKR 2018    Surgery, elective right forearm    Surgery, elective left pinky injury    Surgery, elective lesion removed from vocal cord 3/20    Surgery, elective b/l knee arthroscopies

## 2023-09-05 NOTE — ASU PATIENT PROFILE, ADULT - FALL HARM RISK - UNIVERSAL INTERVENTIONS
Bed in lowest position, wheels locked, appropriate side rails in place/Call bell, personal items and telephone in reach/Instruct patient to call for assistance before getting out of bed or chair/Non-slip footwear when patient is out of bed/[place] to call system/Physically safe environment - no spills, clutter or unnecessary equipment/Purposeful Proactive Rounding/Room/bathroom lighting operational, light cord in reach

## 2023-09-06 ENCOUNTER — APPOINTMENT (OUTPATIENT)
Dept: OTOLARYNGOLOGY | Facility: AMBULATORY SURGERY CENTER | Age: 68
End: 2023-09-06

## 2023-09-06 ENCOUNTER — TRANSCRIPTION ENCOUNTER (OUTPATIENT)
Age: 68
End: 2023-09-06

## 2023-09-06 ENCOUNTER — RESULT REVIEW (OUTPATIENT)
Age: 68
End: 2023-09-06

## 2023-09-06 ENCOUNTER — OUTPATIENT (OUTPATIENT)
Dept: OUTPATIENT SERVICES | Facility: HOSPITAL | Age: 68
LOS: 1 days | Discharge: HOME | End: 2023-09-06
Payer: MEDICARE

## 2023-09-06 VITALS
OXYGEN SATURATION: 95 % | SYSTOLIC BLOOD PRESSURE: 114 MMHG | DIASTOLIC BLOOD PRESSURE: 58 MMHG | TEMPERATURE: 98 F | HEART RATE: 69 BPM | RESPIRATION RATE: 14 BRPM

## 2023-09-06 VITALS
HEIGHT: 70 IN | RESPIRATION RATE: 14 BRPM | HEART RATE: 710 BPM | WEIGHT: 189.6 LBS | OXYGEN SATURATION: 96 % | DIASTOLIC BLOOD PRESSURE: 66 MMHG | TEMPERATURE: 97 F | SYSTOLIC BLOOD PRESSURE: 113 MMHG

## 2023-09-06 DIAGNOSIS — Z41.9 ENCOUNTER FOR PROCEDURE FOR PURPOSES OTHER THAN REMEDYING HEALTH STATE, UNSPECIFIED: Chronic | ICD-10-CM

## 2023-09-06 PROCEDURE — 31253 NSL/SINS NDSC TOTAL: CPT | Mod: 50

## 2023-09-06 PROCEDURE — 31267 ENDOSCOPY MAXILLARY SINUS: CPT | Mod: 50

## 2023-09-06 PROCEDURE — 61782 SCAN PROC CRANIAL EXTRA: CPT

## 2023-09-06 PROCEDURE — 88311 DECALCIFY TISSUE: CPT | Mod: 26

## 2023-09-06 PROCEDURE — 88305 TISSUE EXAM BY PATHOLOGIST: CPT | Mod: 26

## 2023-09-06 PROCEDURE — 30520 REPAIR OF NASAL SEPTUM: CPT

## 2023-09-06 PROCEDURE — 30140 RESECT INFERIOR TURBINATE: CPT | Mod: 50

## 2023-09-06 DEVICE — STENT DRUG ELUTING SINUS INTERSECT ENT PROPEL MINI 16MM: Type: IMPLANTABLE DEVICE | Status: FUNCTIONAL

## 2023-09-06 DEVICE — STENT DRUG ELUTING SINUS BIO ABSORB INTERSECT ENT PROPEL 23MM: Type: IMPLANTABLE DEVICE | Status: FUNCTIONAL

## 2023-09-06 RX ORDER — ONDANSETRON 8 MG/1
4 TABLET, FILM COATED ORAL EVERY 6 HOURS
Refills: 0 | Status: DISCONTINUED | OUTPATIENT
Start: 2023-09-06 | End: 2023-09-06

## 2023-09-06 RX ORDER — APREPITANT 80 MG/1
40 CAPSULE ORAL ONCE
Refills: 0 | Status: COMPLETED | OUTPATIENT
Start: 2023-09-06 | End: 2023-09-06

## 2023-09-06 RX ORDER — OXYCODONE AND ACETAMINOPHEN 5; 325 MG/1; MG/1
1 TABLET ORAL EVERY 4 HOURS
Refills: 0 | Status: DISCONTINUED | OUTPATIENT
Start: 2023-09-06 | End: 2023-09-06

## 2023-09-06 RX ORDER — MORPHINE SULFATE 50 MG/1
2 CAPSULE, EXTENDED RELEASE ORAL
Refills: 0 | Status: DISCONTINUED | OUTPATIENT
Start: 2023-09-06 | End: 2023-09-06

## 2023-09-06 RX ORDER — SODIUM CHLORIDE 9 MG/ML
1000 INJECTION, SOLUTION INTRAVENOUS
Refills: 0 | Status: DISCONTINUED | OUTPATIENT
Start: 2023-09-06 | End: 2023-09-06

## 2023-09-06 RX ORDER — CELECOXIB 200 MG/1
200 CAPSULE ORAL ONCE
Refills: 0 | Status: DISCONTINUED | OUTPATIENT
Start: 2023-09-06 | End: 2023-09-06

## 2023-09-06 RX ORDER — FENTANYL CITRATE 50 UG/ML
25 INJECTION INTRAVENOUS
Refills: 0 | Status: DISCONTINUED | OUTPATIENT
Start: 2023-09-06 | End: 2023-09-06

## 2023-09-06 RX ORDER — ONDANSETRON 8 MG/1
4 TABLET, FILM COATED ORAL ONCE
Refills: 0 | Status: DISCONTINUED | OUTPATIENT
Start: 2023-09-06 | End: 2023-09-06

## 2023-09-06 RX ORDER — ACETAMINOPHEN 500 MG
1000 TABLET ORAL ONCE
Refills: 0 | Status: COMPLETED | OUTPATIENT
Start: 2023-09-06 | End: 2023-09-06

## 2023-09-06 RX ORDER — SODIUM CHLORIDE 9 MG/ML
500 INJECTION, SOLUTION INTRAVENOUS
Refills: 0 | Status: DISCONTINUED | OUTPATIENT
Start: 2023-09-06 | End: 2023-09-06

## 2023-09-06 RX ORDER — OXYCODONE AND ACETAMINOPHEN 5; 325 MG/1; MG/1
5-325 TABLET ORAL
Qty: 20 | Refills: 0 | Status: ACTIVE | COMMUNITY
Start: 2023-09-06 | End: 1900-01-01

## 2023-09-06 RX ORDER — CEFDINIR 250 MG/5ML
1 POWDER, FOR SUSPENSION ORAL
Qty: 20 | Refills: 0
Start: 2023-09-06 | End: 2023-09-15

## 2023-09-06 RX ORDER — OXYCODONE HYDROCHLORIDE 5 MG/1
5 TABLET ORAL ONCE
Refills: 0 | Status: DISCONTINUED | OUTPATIENT
Start: 2023-09-06 | End: 2023-09-06

## 2023-09-06 RX ORDER — DIPHENHYDRAMINE HCL 50 MG
12.5 CAPSULE ORAL ONCE
Refills: 0 | Status: DISCONTINUED | OUTPATIENT
Start: 2023-09-06 | End: 2023-09-06

## 2023-09-06 RX ADMIN — Medication 1000 MILLIGRAM(S): at 08:30

## 2023-09-06 RX ADMIN — APREPITANT 40 MILLIGRAM(S): 80 CAPSULE ORAL at 08:30

## 2023-09-06 NOTE — PRE-ANESTHESIA EVALUATION ADULT - NSANTHOSAYNRD_GEN_A_CORE
No. BREANA screening performed.  STOP BANG Legend: 0-2 = LOW Risk; 3-4 = INTERMEDIATE Risk; 5-8 = HIGH Risk

## 2023-09-06 NOTE — ASU DISCHARGE PLAN (ADULT/PEDIATRIC) - CARE PROVIDER_API CALL
Yaw James  Otolaryngology  [address]  [place] [address]  Phone: [phone]  Fax: [phone]  Follow Up Time:

## 2023-09-06 NOTE — ASU DISCHARGE PLAN (ADULT/PEDIATRIC) - NS MD DC FALL RISK RISK
For information on Fall & Injury Prevention, visit: [email or web address] OR  [email or web address] OR  https://www.cdc.gov/steadi/patient.html

## 2023-09-06 NOTE — ASU DISCHARGE PLAN (ADULT/PEDIATRIC) - ASU DC SPECIAL INSTRUCTIONSFT
open mouth sneezing  no nose blowing  no drinking from a straw  no heavy lifting  take steroid taper as prescribed  take antibiotics as prescribed  a little bit of oozing is normal; call the office for any dina bleeding that does not stop

## 2023-09-09 ENCOUNTER — TRANSCRIPTION ENCOUNTER (OUTPATIENT)
Age: 68
End: 2023-09-09

## 2023-09-09 NOTE — BRIEF OPERATIVE NOTE - NSICDXBRIEFPROCEDURE_GEN_ALL_CORE_FT
PROCEDURES:  Functional endoscopic sinus surgery in adult 09-Sep-2023 08:06:20  Mateo Waller   PROCEDURES:  Functional endoscopic sinus surgery in adult 09-Sep-2023 08:06:20  Mateo Waller  Septoplasty 09-Sep-2023 08:08:15  Mateo Waller  Reduction, inferior nasal turbinate 09-Sep-2023 08:08:26  Mateo Waller  Outfracture, nasal turbinate 09-Sep-2023 08:08:34  Mateo Waller

## 2023-09-11 LAB — SURGICAL PATHOLOGY STUDY: SIGNIFICANT CHANGE UP

## 2023-09-14 ENCOUNTER — APPOINTMENT (OUTPATIENT)
Dept: OTOLARYNGOLOGY | Facility: CLINIC | Age: 68
End: 2023-09-14
Payer: MEDICARE

## 2023-09-14 PROCEDURE — 31237 NSL/SINS NDSC SURG BX POLYPC: CPT | Mod: 50,58

## 2023-09-14 PROCEDURE — 99024 POSTOP FOLLOW-UP VISIT: CPT

## 2023-09-21 ENCOUNTER — APPOINTMENT (OUTPATIENT)
Dept: OTOLARYNGOLOGY | Facility: CLINIC | Age: 68
End: 2023-09-21
Payer: MEDICARE

## 2023-09-21 VITALS
SYSTOLIC BLOOD PRESSURE: 111 MMHG | TEMPERATURE: 98.3 F | HEIGHT: 70 IN | WEIGHT: 190 LBS | DIASTOLIC BLOOD PRESSURE: 65 MMHG | BODY MASS INDEX: 27.2 KG/M2 | HEART RATE: 62 BPM | OXYGEN SATURATION: 97 %

## 2023-09-21 PROCEDURE — 31237 NSL/SINS NDSC SURG BX POLYPC: CPT | Mod: 50,58

## 2023-09-21 PROCEDURE — 99024 POSTOP FOLLOW-UP VISIT: CPT

## 2023-09-28 ENCOUNTER — APPOINTMENT (OUTPATIENT)
Dept: OTOLARYNGOLOGY | Facility: CLINIC | Age: 68
End: 2023-09-28
Payer: MEDICARE

## 2023-09-28 VITALS
SYSTOLIC BLOOD PRESSURE: 104 MMHG | BODY MASS INDEX: 27.2 KG/M2 | HEART RATE: 70 BPM | HEIGHT: 70 IN | OXYGEN SATURATION: 98 % | DIASTOLIC BLOOD PRESSURE: 67 MMHG | TEMPERATURE: 97.8 F | WEIGHT: 190 LBS

## 2023-09-28 DIAGNOSIS — J32.9 CHRONIC SINUSITIS, UNSPECIFIED: ICD-10-CM

## 2023-09-28 DIAGNOSIS — R09.81 NASAL CONGESTION: ICD-10-CM

## 2023-09-28 DIAGNOSIS — J33.9 NASAL POLYP, UNSPECIFIED: ICD-10-CM

## 2023-09-28 PROCEDURE — 99024 POSTOP FOLLOW-UP VISIT: CPT

## 2023-09-28 PROCEDURE — 31237 NSL/SINS NDSC SURG BX POLYPC: CPT | Mod: 50,58

## 2023-10-04 ENCOUNTER — NON-APPOINTMENT (OUTPATIENT)
Age: 68
End: 2023-10-04

## 2023-12-28 ENCOUNTER — APPOINTMENT (OUTPATIENT)
Dept: OTOLARYNGOLOGY | Facility: CLINIC | Age: 68
End: 2023-12-28

## 2024-05-01 PROBLEM — Z85.89 PERSONAL HISTORY OF MALIGNANT NEOPLASM OF OTHER ORGANS AND SYSTEMS: Chronic | Status: ACTIVE | Noted: 2020-05-06

## 2024-08-01 ENCOUNTER — APPOINTMENT (OUTPATIENT)
Dept: OTOLARYNGOLOGY | Facility: CLINIC | Age: 69
End: 2024-08-01

## 2024-08-01 VITALS
HEART RATE: 80 BPM | SYSTOLIC BLOOD PRESSURE: 112 MMHG | BODY MASS INDEX: 27.2 KG/M2 | TEMPERATURE: 98.7 F | DIASTOLIC BLOOD PRESSURE: 70 MMHG | HEIGHT: 70 IN | WEIGHT: 190 LBS | OXYGEN SATURATION: 96 %

## 2024-08-01 DIAGNOSIS — R09.81 NASAL CONGESTION: ICD-10-CM

## 2024-08-01 DIAGNOSIS — R43.0 ANOSMIA: ICD-10-CM

## 2024-08-01 DIAGNOSIS — J33.9 NASAL POLYP, UNSPECIFIED: ICD-10-CM

## 2024-08-01 DIAGNOSIS — J32.9 CHRONIC SINUSITIS, UNSPECIFIED: ICD-10-CM

## 2024-08-01 DIAGNOSIS — D02.0 CARCINOMA IN SITU OF LARYNX: ICD-10-CM

## 2024-08-01 PROCEDURE — 99215 OFFICE O/P EST HI 40 MIN: CPT | Mod: 25

## 2024-08-01 PROCEDURE — 31579 LARYNGOSCOPY TELESCOPIC: CPT

## 2024-08-01 PROCEDURE — 31231 NASAL ENDOSCOPY DX: CPT | Mod: 59

## 2024-08-01 PROCEDURE — 99205 OFFICE O/P NEW HI 60 MIN: CPT | Mod: 25

## 2024-08-01 NOTE — HISTORY OF PRESENT ILLNESS
[de-identified] : 65 yo male, former patient of Dr. 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  Pt underwent excisional biopsy in spring of 2020, followed by a negative re biopsy 5/2020.  Since that time the patient has been stable and well.  No changes in voice.  No throat pain.  No issues with chewing, eating swallowing or breathing.  No fevers, night sweats or weight loss.  No otalgia.  Non smoker, non drinker.  Previously employed as a  with work at ground [employer]  Of note the patient he does have a longer standing hx of nasal congestion, anosmia and chronic sinusitis.  He has a known dx of nasal polyposis, but never had this addressed.  No ENT issues otherwise. - Update 12/20/21 Overall stable and well.  No fever, night sweats, weight loss or referred otalgia.  No change in voice, swallowing or breathing.  Pt still with nasal symptoms.  He did use nasal saline and nasal steroids to minimal avail.  He completed a CT sinus and presents to review. - 9/1/23 Overall stable and well.  No fever, night sweats, weight loss, neck mass or referred otalgia.  No change in voice, swallowing or breathing.  Pt still with nasal symptoms. He has decreased sense of smell. He did use nasal saline and nasal steroids to minimal avail.  Had PFTs completed today and had noted improvement from previous.  Overall feeling very well. Of note he has decreased hearing. He had a hearing test in [place] and hearing aids were recommended. He is planning on getting hearing aids after surgery.  -  9/14/2023 POD #8 s/p endoscopic sinus surgery, septoplasty and turbinectomy.  Overall stable and well.  No significant bleeding over the past week.  No significant pain.  Patient presents for first debridement.  No new ear, nose, throat symptoms otherwise. - 9/28/2023 POD #22 s/p endoscopic sinus surgery, septoplasty and turbinectomy.  Overall stable and well.  No significant bleeding over the past week.  No significant pain.  Patient presents for first debridement.  No new ear, nose, throat symptoms otherwise. - [FreeTextEntry1] : 8/1/24 11 months s/p endoscopic sinus surgery, septoplasty and turbinectomy. He is using saline sinus rinses with budesonide.  Notes significant improvement in terms of nasal symptoms.  Has not had any sinus infections.  Notes he has improved ability to breathe.  He is very happy with the results.  He still feels that smell is blunted though his taste is normal.    No fevers, night sweats, or unintentional weight loss.  Feels that voice has been a bit more hoarse over the past few months, however wife doesn't think it has changed.  No new ENT issues otherwise.

## 2024-08-01 NOTE — PROCEDURE
[FreeTextEntry6] : - Procedure Note    Pre-operative Diagnosis: Nasal polyposis Post-operative Diagnosis: A few nonobstructive nasal polyps emanating from the anterior portion of the OMC but otherwise the sinuses are widely patent Anesthesia: Topical Procedure: Bilateral nasal endoscopy    Procedure Details:  After topical anesthesia and decongestant, the patient was placed in the supine position. The telescope was passed along the left nasal floor to the nasopharynx. It was then passed into the region of the middle meatus, middle turbinate, and the sphenoethmoid region.  An identical procedure was performed on the right side.     Findings:  Mucosa: 	                Widely patent ostia bilaterally Nasal septum: 	midline 	 Discharge: 	none	 Turbinates: 	Surgically reduced and outfractured Adenoid: 	                normal	 Posterior choanae: 	normal	 Eustachian tubes: 	normal	 Mucous stranding: 	normal 	 Lesions: 	                Wanted to nonobstructive nasal polyps emanating from the anterior portion of the OMC on the left	    Comments:  Condition: Stable. Patient tolerated procedure well. Complications: None  [de-identified] : Laryngoscopy: - Flexible Laryngoscopy with Stroboscopy  Procedure Details: The patient was placed in the sitting position. After decongestant and anesthesia were applied the laryngoscope was passed. The nasal cavities, nasopharynx, oropharynx, hypopharynx, and larynx were all examined. Vocal folds were examined during respiration and phonation. The following findings were noted:  Findings: Nose: Septum is midline, turbinates are normal, nasal airways patent, +bilateral nasal polyposis, mucosa normal Nasopharynx: Adenoids normal, no masses, eustachian tube normal Oropharynx: Pharyngeal walls symmetric and without lesion. Tonsils/fossae symmetric Hypopharynx: Hypopharynx and pyriform sinuses without lesion. No masses or asymmetry. No pooling of secretions. Larynx: Epiglottis and aryepiglottic folds were sharp and crisp bilaterally. Bilateral false vocal folds normal appearance. Airway was widely patent.  Strobe Exam Ratings   TVF Appearance: normal bilaterally with minimal post surgical change TVF Mobility: normal Edema/hypertrophy: normal Mucus on TVF: normal Glottic Closure: adequate Mucosal Wave: normal Amplitude of Vibration: normal Phase: symmetric Supraglottic Hyperfunction: none Other Findings:  Condition: Stable. Patient tolerated procedure well.  Complications: None.

## 2024-08-01 NOTE — HISTORY OF PRESENT ILLNESS
[de-identified] : 67 yo male, former patient of Dr. 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  Pt underwent excisional biopsy in spring of 2020, followed by a negative re biopsy 5/2020.  Since that time the patient has been stable and well.  No changes in voice.  No throat pain.  No issues with chewing, eating swallowing or breathing.  No fevers, night sweats or weight loss.  No otalgia.  Non smoker, non drinker.  Previously employed as a  with work at ground [employer]  Of note the patient he does have a longer standing hx of nasal congestion, anosmia and chronic sinusitis.  He has a known dx of nasal polyposis, but never had this addressed.  No ENT issues otherwise. - Update 12/20/21 Overall stable and well.  No fever, night sweats, weight loss or referred otalgia.  No change in voice, swallowing or breathing.  Pt still with nasal symptoms.  He did use nasal saline and nasal steroids to minimal avail.  He completed a CT sinus and presents to review. - 9/1/23 Overall stable and well.  No fever, night sweats, weight loss, neck mass or referred otalgia.  No change in voice, swallowing or breathing.  Pt still with nasal symptoms. He has decreased sense of smell. He did use nasal saline and nasal steroids to minimal avail.  Had PFTs completed today and had noted improvement from previous.  Overall feeling very well. Of note he has decreased hearing. He had a hearing test in [place] and hearing aids were recommended. He is planning on getting hearing aids after surgery.  -  9/14/2023 POD #8 s/p endoscopic sinus surgery, septoplasty and turbinectomy.  Overall stable and well.  No significant bleeding over the past week.  No significant pain.  Patient presents for first debridement.  No new ear, nose, throat symptoms otherwise. - 9/28/2023 POD #22 s/p endoscopic sinus surgery, septoplasty and turbinectomy.  Overall stable and well.  No significant bleeding over the past week.  No significant pain.  Patient presents for first debridement.  No new ear, nose, throat symptoms otherwise. - [FreeTextEntry1] : 8/1/24 11 months s/p endoscopic sinus surgery, septoplasty and turbinectomy. He is using saline sinus rinses with budesonide.  Notes significant improvement in terms of nasal symptoms.  Has not had any sinus infections.  Notes he has improved ability to breathe.  He is very happy with the results.  He still feels that smell is blunted though his taste is normal.    No fevers, night sweats, or unintentional weight loss.  Feels that voice has been a bit more hoarse over the past few months, however wife doesn't think it has changed.  No new ENT issues otherwise.

## 2024-08-01 NOTE — ASSESSMENT
[FreeTextEntry1] : 69 yo male with a hx of carcinoma in-situ of the left true vocal fold.  Pt is stable and well without changes.  He should followup in 3 mo for repeat evaluation.    9/28/2023: Patient is now POD #22 s/p endoscopic sinus surgery, septoplasty and turbinectomy.  Overall stable and well.    He tolerated debridement.  Already notes significant improvement in terms of ability to breathe.  At this time I am recommending nasal saline rinses with budesonide follow-up 1 week for repeat debridement.  8/1/24: 11 months s/p endoscopic sinus surgery, septoplasty, turbinectomy, and nasal polypectomy.  Overall stable and doing well.  Significant improvement in terms of nasal issues.  Recommended continuing nasal saline nasal steroids.  Patient to consider Dupixent.  For some decreased smell and recommending smell training and this was reviewed.  In terms of throat related issues no evidence of any disease on the vocal folds.  Recommended continued voice hygiene.  Follow-up in 6 months for surveillance.  - Nasal saline with budesonide  - Smell training - Consider injectable immunotherapy/dupixent (pt would like to hold off for now) - fu 6 mo for head and neck exam - he lives in [place] and said he has an ENT there

## 2024-08-01 NOTE — PHYSICAL EXAM
[Normal] : mucosa is normal [Midline] : trachea located in midline position [FreeTextEntry1] : normal clarity, with adequate pitch control, range and projection [de-identified] : Surgically reduced and outfractured

## 2024-08-01 NOTE — PHYSICAL EXAM
[Normal] : mucosa is normal [Midline] : trachea located in midline position [FreeTextEntry1] : normal clarity, with adequate pitch control, range and projection [de-identified] : Surgically reduced and outfractured

## 2024-08-01 NOTE — PROCEDURE
[FreeTextEntry6] : - Procedure Note    Pre-operative Diagnosis: Nasal polyposis Post-operative Diagnosis: A few nonobstructive nasal polyps emanating from the anterior portion of the OMC but otherwise the sinuses are widely patent Anesthesia: Topical Procedure: Bilateral nasal endoscopy    Procedure Details:  After topical anesthesia and decongestant, the patient was placed in the supine position. The telescope was passed along the left nasal floor to the nasopharynx. It was then passed into the region of the middle meatus, middle turbinate, and the sphenoethmoid region.  An identical procedure was performed on the right side.     Findings:  Mucosa: 	                Widely patent ostia bilaterally Nasal septum: 	midline 	 Discharge: 	none	 Turbinates: 	Surgically reduced and outfractured Adenoid: 	                normal	 Posterior choanae: 	normal	 Eustachian tubes: 	normal	 Mucous stranding: 	normal 	 Lesions: 	                Wanted to nonobstructive nasal polyps emanating from the anterior portion of the OMC on the left	    Comments:  Condition: Stable. Patient tolerated procedure well. Complications: None  [de-identified] : Laryngoscopy: - Flexible Laryngoscopy with Stroboscopy  Procedure Details: The patient was placed in the sitting position. After decongestant and anesthesia were applied the laryngoscope was passed. The nasal cavities, nasopharynx, oropharynx, hypopharynx, and larynx were all examined. Vocal folds were examined during respiration and phonation. The following findings were noted:  Findings: Nose: Septum is midline, turbinates are normal, nasal airways patent, +bilateral nasal polyposis, mucosa normal Nasopharynx: Adenoids normal, no masses, eustachian tube normal Oropharynx: Pharyngeal walls symmetric and without lesion. Tonsils/fossae symmetric Hypopharynx: Hypopharynx and pyriform sinuses without lesion. No masses or asymmetry. No pooling of secretions. Larynx: Epiglottis and aryepiglottic folds were sharp and crisp bilaterally. Bilateral false vocal folds normal appearance. Airway was widely patent.  Strobe Exam Ratings   TVF Appearance: normal bilaterally with minimal post surgical change TVF Mobility: normal Edema/hypertrophy: normal Mucus on TVF: normal Glottic Closure: adequate Mucosal Wave: normal Amplitude of Vibration: normal Phase: symmetric Supraglottic Hyperfunction: none Other Findings:  Condition: Stable. Patient tolerated procedure well.  Complications: None.

## (undated) DEVICE — MEDTRONIC AXIEM PATIENT TRACKER NON-INVASIVE

## (undated) DEVICE — DRSG TELFA 3 X 8

## (undated) DEVICE — SOL ANTI FOG

## (undated) DEVICE — SUT PLAIN GUT 4-0 18" SC-1

## (undated) DEVICE — SUT PDS II 4-0 18" P-3

## (undated) DEVICE — BLADE MEDTRONIC ENT FUSION QUADCUT ROTATABLE STRAIGHT 4.3MM X 13CM

## (undated) DEVICE — DRSG NASAL DRESSING HOLDER

## (undated) DEVICE — STAPLER SKIN VISI-STAT 35 WIDE

## (undated) DEVICE — SUT CHROMIC 4-0 18" S-2

## (undated) DEVICE — DRAPE MAYO STAND 23"

## (undated) DEVICE — BLADE MEDTRONIC ENT INFERIOR TURBINATE ROTATABLE STRAIGHT 2MM X11CM

## (undated) DEVICE — BLADE MEDTRONIC ENT QUADCUT ROTATABLE STRAIGHT 4MM X 13CM

## (undated) DEVICE — DRSG FOAM STAMMBERGER SINUS

## (undated) DEVICE — ELCTR COAGULATOR HANDSWITCHING 10FR

## (undated) DEVICE — MEDTRONIC INSTRUMENT TRACKER ENT

## (undated) DEVICE — ELCTR BOVIE PENCIL BLADE 10FT

## (undated) DEVICE — GLV 7.5 PROTEXIS (WHITE)

## (undated) DEVICE — TUBING IRRIGATION STRAIGHT SHOT

## (undated) DEVICE — SUT PROLENE 3-0 36" RB-1

## (undated) DEVICE — SUT CHROMIC 4-0 18" G-3

## (undated) DEVICE — BEAVER BLADE MIN-BLADE ROUNDED TIP 1-SIDE SHARP (GREEN)

## (undated) DEVICE — Device

## (undated) DEVICE — TUBING SUCTION NONCONDUCTIVE 6MM X 12FT

## (undated) DEVICE — CLEANING SHEATH ENDO-SCRUB FOR STORZ 7210AA TELESCOPE 4MM 0 DEGREE